# Patient Record
Sex: MALE | Race: OTHER | HISPANIC OR LATINO | URBAN - METROPOLITAN AREA
[De-identification: names, ages, dates, MRNs, and addresses within clinical notes are randomized per-mention and may not be internally consistent; named-entity substitution may affect disease eponyms.]

---

## 2018-11-15 ENCOUNTER — EMERGENCY (EMERGENCY)
Facility: HOSPITAL | Age: 33
LOS: 1 days | Discharge: HOME | End: 2018-11-15
Attending: STUDENT IN AN ORGANIZED HEALTH CARE EDUCATION/TRAINING PROGRAM
Payer: COMMERCIAL

## 2018-11-15 VITALS — WEIGHT: 220.02 LBS

## 2018-11-15 VITALS
HEART RATE: 85 BPM | OXYGEN SATURATION: 98 % | DIASTOLIC BLOOD PRESSURE: 90 MMHG | SYSTOLIC BLOOD PRESSURE: 133 MMHG | TEMPERATURE: 97 F | RESPIRATION RATE: 20 BRPM

## 2018-11-15 PROCEDURE — 99283 EMERGENCY DEPT VISIT LOW MDM: CPT

## 2018-11-15 PROCEDURE — 99053 MED SERV 10PM-8AM 24 HR FAC: CPT

## 2018-11-15 PROCEDURE — 99283 EMERGENCY DEPT VISIT LOW MDM: CPT | Mod: 25

## 2018-11-15 RX ORDER — DIPHENHYDRAMINE HCL 50 MG
50 CAPSULE ORAL EVERY 4 HOURS
Qty: 0 | Refills: 0 | Status: DISCONTINUED | OUTPATIENT
Start: 2018-11-15 | End: 2018-11-20

## 2018-11-15 RX ORDER — EPINEPHRINE 0.3 MG/.3ML
0.3 INJECTION INTRAMUSCULAR; SUBCUTANEOUS
Qty: 1 | Refills: 0 | OUTPATIENT
Start: 2018-11-15 | End: 2018-11-15

## 2018-11-15 RX ADMIN — Medication 50 MILLIGRAM(S): at 05:30

## 2018-11-15 RX ADMIN — Medication 60 MILLIGRAM(S): at 05:30

## 2018-11-15 NOTE — ED ADULT NURSE NOTE - NSIMPLEMENTINTERV_GEN_ALL_ED
Implemented All Universal Safety Interventions:  [place] to call system. Call bell, personal items and telephone within reach. Instruct patient to call for assistance. Room bathroom lighting operational. Non-slip footwear when patient is off stretcher. Physically safe environment: no spills, clutter or unnecessary equipment. Stretcher in lowest position, wheels locked, appropriate side rails in place.

## 2018-11-15 NOTE — ED ADULT NURSE NOTE - OBJECTIVE STATEMENT
Pt. complaining of bee sting approx 1 hour ago.  Pt. states "I had a reaction when I was 4 years old and I got stung again today but feel nothing." b sting

## 2018-11-15 NOTE — ED PROVIDER NOTE - OBJECTIVE STATEMENT
Pt is a 33y M with no PMH presenting with bee sting to R inner ankle 1 hr PTA. Pt is accompanied by mother and she states that when he was approx. 4 years old he was stung by a bee and had facial swelling and "a really bad reaction". When asked about admission to the hospital or intubation Mother denies. He denies any symptoms at this time. Pt states he knew there were bees in the house and is positive a bee stung him. There is a pinpoint bite on the inner ankle with slight erythema. No swelling/SOB/angioedema/hives. No other complaints.

## 2018-11-15 NOTE — ED PROVIDER NOTE - ATTENDING CONTRIBUTION TO CARE
34 yo male with hx of bee allergy ( last episode was when he was [age] yrs old as per mother) presents for evaluation of foot pain after being stung by a bee along he instep of his right foot. I personally saw the patient with the PA, and completed the key components of the history and physical exam. I then discussed the management plan with the PA.

## 2018-11-15 NOTE — ED PROVIDER NOTE - CONDITION AT DISCHARGE:
Problem: Safety  Goal: Will remain free from falls    Intervention: Assess risk factors for falls  Monitored pt ambulate to rest room. Pt ambulated with steady gate. Anti-slip socks in place.      Problem: Urinary Elimination:  Goal: Ability to reestablish a normal urinary elimination pattern will improve    Intervention: Assess and monitor for signs and symptoms of urinary retention  Discussed with pt about urinary retention. Pt explained urinary difficulty and was able to determine accurate urinary status.         Satisfactory

## 2018-11-15 NOTE — ED PROVIDER NOTE - PROGRESS NOTE DETAILS
Pt and mother are anxious to leave. Pt denying any symptoms at this time. Benadryl and steroids given. Will dc with medrol dose pack and epi-pen. Pt told to take Benadryl OTC at home.

## 2018-11-15 NOTE — ED PROVIDER NOTE - CHPI ED SYMPTOMS NEG
no cough/no vomiting/no throat itching/no wheezing/no shortness of breath/no swelling of face, tongue/no difficulty breathing/no nausea/no rash/no difficulty swallowing